# Patient Record
Sex: MALE | Race: WHITE | Employment: FULL TIME | URBAN - METROPOLITAN AREA
[De-identification: names, ages, dates, MRNs, and addresses within clinical notes are randomized per-mention and may not be internally consistent; named-entity substitution may affect disease eponyms.]

---

## 2019-06-23 ENCOUNTER — APPOINTMENT (OUTPATIENT)
Dept: GENERAL RADIOLOGY | Facility: CLINIC | Age: 39
End: 2019-06-23
Attending: EMERGENCY MEDICINE
Payer: COMMERCIAL

## 2019-06-23 ENCOUNTER — HOSPITAL ENCOUNTER (EMERGENCY)
Facility: CLINIC | Age: 39
Discharge: HOME | End: 2019-06-23
Attending: EMERGENCY MEDICINE | Admitting: EMERGENCY MEDICINE
Payer: COMMERCIAL

## 2019-06-23 VITALS
SYSTOLIC BLOOD PRESSURE: 134 MMHG | DIASTOLIC BLOOD PRESSURE: 91 MMHG | OXYGEN SATURATION: 98 % | TEMPERATURE: 97.9 F | RESPIRATION RATE: 16 BRPM | WEIGHT: 215 LBS | HEART RATE: 68 BPM

## 2019-06-23 DIAGNOSIS — S69.92XA INJURY OF LEFT HAND, INITIAL ENCOUNTER: ICD-10-CM

## 2019-06-23 PROCEDURE — 25000132 ZZH RX MED GY IP 250 OP 250 PS 637: Performed by: EMERGENCY MEDICINE

## 2019-06-23 PROCEDURE — 90715 TDAP VACCINE 7 YRS/> IM: CPT | Performed by: EMERGENCY MEDICINE

## 2019-06-23 PROCEDURE — 90471 IMMUNIZATION ADMIN: CPT

## 2019-06-23 PROCEDURE — 25000128 H RX IP 250 OP 636: Performed by: EMERGENCY MEDICINE

## 2019-06-23 PROCEDURE — 96374 THER/PROPH/DIAG INJ IV PUSH: CPT

## 2019-06-23 PROCEDURE — 29130 APPL FINGER SPLINT STATIC: CPT | Mod: F4

## 2019-06-23 PROCEDURE — 12002 RPR S/N/AX/GEN/TRNK2.6-7.5CM: CPT

## 2019-06-23 PROCEDURE — 99284 EMERGENCY DEPT VISIT MOD MDM: CPT | Mod: 25

## 2019-06-23 PROCEDURE — 73140 X-RAY EXAM OF FINGER(S): CPT | Mod: LT

## 2019-06-23 RX ORDER — IBUPROFEN 600 MG/1
600 TABLET, FILM COATED ORAL ONCE
Status: COMPLETED | OUTPATIENT
Start: 2019-06-23 | End: 2019-06-23

## 2019-06-23 RX ORDER — HYDROCODONE BITARTRATE AND ACETAMINOPHEN 5; 325 MG/1; MG/1
1 TABLET ORAL EVERY 6 HOURS PRN
Qty: 18 TABLET | Refills: 0 | Status: SHIPPED | OUTPATIENT
Start: 2019-06-23 | End: 2019-06-26

## 2019-06-23 RX ORDER — BUPIVACAINE HYDROCHLORIDE 5 MG/ML
INJECTION, SOLUTION PERINEURAL
Status: DISCONTINUED
Start: 2019-06-23 | End: 2019-06-23 | Stop reason: HOSPADM

## 2019-06-23 RX ORDER — CEPHALEXIN 500 MG/1
500 CAPSULE ORAL 4 TIMES DAILY
Qty: 40 CAPSULE | Refills: 0 | Status: SHIPPED | OUTPATIENT
Start: 2019-06-23 | End: 2019-07-03

## 2019-06-23 RX ORDER — CEFAZOLIN SODIUM 2 G/100ML
2 INJECTION, SOLUTION INTRAVENOUS ONCE
Status: COMPLETED | OUTPATIENT
Start: 2019-06-23 | End: 2019-06-23

## 2019-06-23 RX ADMIN — IBUPROFEN 600 MG: 600 TABLET, FILM COATED ORAL at 13:12

## 2019-06-23 RX ADMIN — CEFAZOLIN SODIUM 2 G: 2 INJECTION, SOLUTION INTRAVENOUS at 14:09

## 2019-06-23 RX ADMIN — CLOSTRIDIUM TETANI TOXOID ANTIGEN (FORMALDEHYDE INACTIVATED), CORYNEBACTERIUM DIPHTHERIAE TOXOID ANTIGEN (FORMALDEHYDE INACTIVATED), BORDETELLA PERTUSSIS TOXOID ANTIGEN (GLUTARALDEHYDE INACTIVATED), BORDETELLA PERTUSSIS FILAMENTOUS HEMAGGLUTININ ANTIGEN (FORMALDEHYDE INACTIVATED), BORDETELLA PERTUSSIS PERTACTIN ANTIGEN, AND BORDETELLA PERTUSSIS FIMBRIAE 2/3 ANTIGEN 0.5 ML: 5; 2; 2.5; 5; 3; 5 INJECTION, SUSPENSION INTRAMUSCULAR at 14:09

## 2019-06-23 ASSESSMENT — ENCOUNTER SYMPTOMS
NUMBNESS: 0
ROS SKIN COMMENTS: LACERATION
WEAKNESS: 1

## 2019-06-23 NOTE — LETTER
June 23, 2019      To Whom It May Concern:      Prieto You was seen in our Emergency Department today, 06/23/19.  Please excuse him from work on 6/24/2019 for continued care.  Sincerely,        Rose Marie CAMPBELL RN

## 2019-06-23 NOTE — ED PROVIDER NOTES
"  History     Chief Complaint:  Hand Injury    HPI:   The history is provided by the patient.      Prieto You is a generally healthy right-handed 38 year old male who presents with a left hand laceration. The patient states he was using a electric saw when he accidentally cut into his left hand just at the base and to the side of the left pinky finger. The patient was able to control the bleeding at home. He denies loss of sensation. He states that the pinky finger feels as if it is \"stuck\" and he is having difficulty fully extending it. The patient denies any further injuries. He is unsure when his last Tdap immunization was.     Allergies:  No known drug allergies      Medications:    The patient is not currently taking any prescribed medications.     Past Medical History:    The patient does not have any past pertinent medical history.     Past Surgical History:    History reviewed. No pertinent surgical history.     Family History:    History reviewed. No pertinent family history.      Social History:  No PCP  The patient is accompanied to the ED by a family member.      Review of Systems   Skin:        Laceration    Neurological: Positive for weakness. Negative for numbness.   All other systems reviewed and are negative.      Physical Exam     Patient Vitals for the past 24 hrs:   BP Temp Temp src Pulse Heart Rate Resp SpO2 Weight   06/23/19 1610 (!) 134/91 -- -- 68 -- 16 98 % --   06/23/19 1249 (!) 167/113 97.9  F (36.6  C) Oral -- 92 16 98 % 97.5 kg (215 lb)        Physical Exam  General: Sitting on gurney with gauze over left hand.   HEENT:   The scalp and head appear normal    The pupils are equal, round, and reactive to light    Extraocular muscles are intact.    The nose is normal.    The oropharynx is normal.      Uvula is in the midline.    Neck:  Normal range of motion.    Lungs:  Clear.      No rales, no wheezing.      There is no tachypnea.  Non-labored.  Cardiac: Regular rate.      Normal S1 and " S2.      No pathological murmur/rub    Abdomen: Soft. No distension, no localized tenderness or rebound.  MS:  5 cm laceration that crosses the ulnar aspect of the 5th MCP of the left non-dominant hand.    Lacerated his ulnar collateral stabilizers to that joint.     The finger is deviated in a varus deformity in a radial direction.     When he attempts to bend, cannot flex because the 4th finger prevents it.     Not complete extension, intact 2-point discrimination at less that 5 mm, intact sharp versus dull sense distally in the finger.   Neurologic:     Normal mentation.  No cranial nerve deficits.  No focal motor or sensory changes.      Speech normal.  Psych:  Awake.     Alert.      Normal affect.      Appropriate interactions.  Skin:  No rash.      No lesions.      Emergency Department Course     Imaging:  Radiographic findings were communicated with the patient who voiced understanding of the findings.    Fingers XR, 2-3 views, left  Impression: Soft tissue irregularity adjacent to the left fifth MCP  joint is consistent with laceration. Lucency within the adjacent fifth  metacarpal head is consistent with a bony defect related to the saw  injury. Multiple small associated bony fragments are noted adjacent to  the fifth MCP joint. A few tiny irregular high density foci within the  soft tissue adjacent to the left fifth DIP joint may represent tiny  foreign bodies in this location.  As read by Radiology.       Narrative: Procedure: Laceration Repair        LACERATION:  A subcutaneous clean 5 cm laceration.      LOCATION:  crosses the ulnar aspect of the 5th MCP of left hand       FUNCTION:  Distally sensation and circulation are intact.      ANESTHESIA:  Local using bupivacaine 0.5% total of 5 mLs      PREPARATION:  Irrigation and Scrubbing with Normal Saline and Shur Clens      DEBRIDEMENT:  no debridement      CLOSURE:  Wound was loosely closed with One Layer.  Skin closed with 6 x 5.0 Ethylon using  interrupted sutures.     Interventions:  1312: ibuprofen 600 mg, PO  1409: ANCEF intermittent infusion 2 g, IV  1409: Tdap injection      Emergency Department Course:  Past medical records, nursing notes, and vitals reviewed.  1315: I performed an exam of the patient and obtained history, as documented above.  The patient was sent for X-ray imaging while in the emergency department, findings above.       1430: I rechecked the patient. Explained findings to the patient.    1451: I discussed the patient with Dr. Mccall of hand surgery.     I rechecked the patient. Findings and plan explained to the Patient. Patient discharged home with instructions regarding supportive care, medications, and reasons to return. The importance of close follow-up was reviewed.      Impression & Plan      Medical Decision Making:  Prieto You is a right hand dominant male who lacerated his left nondominant hand with a miter saw this morning while building a porch. It is fractured his distal fifth metacarpal just below the head. It had disrupted his ulnar collateral stabilizers on that hand making it difficult for him to flex his finger. I don't think he lacerated a flexor tendon. I am concerned about the extensor tendons though, as this does overlie their vicinity. It doesn't appear that he can obtain full extension either. He does have intact sensation as per my sensory exam. The findings were discussed with Dr. Mccall.     The patient's tetanus status has been updated and he was given 2 g of ancef IV as well. Local block was done using bupivacaine 0.5% 5 ml with excellent anesthesia. It was scrubbed with ShurCleanse, irrigated with normal saline for one liter, and loosely sewn with 6 x 4.0 ethylon interrupted with good hemostasis and good alignment and closure.     He is placed in a bacitracin adaptic gauze wrap dressing and then given a volar alumafoam splint. Immobilized the affected digit. Sling for when he is upright. He will  elevate above his heart tonight as much as possible. He will start the Keflex tonight. Use the Vicodin as directed. Dr. Mccall's office will contact him either tonight or tomorrow to give him the time and place they should meet to take him to the OR for washing this out and repairing the damaged structures. He should be NPO after midnight.     Critical Care time:  none    Diagnosis:    ICD-10-CM    1. Injury of left hand, initial encounter S69.92XA        Disposition:  discharged to home    Discharge Medications:     Medication List      Started    cephALEXin 500 MG capsule  Commonly known as:  KEFLEX  500 mg, Oral, 4 TIMES DAILY     HYDROcodone-acetaminophen 5-325 MG tablet  Commonly known as:  NORCO  1 tablet, Oral, EVERY 6 HOURS PRN          I, Megan Beh, am serving as a scribe at 1:15 PM on 6/23/2019 to document services personally performed by Asael Cardona MD based on my observations and the provider's statements to me.      Megan Beh  6/23/2019   [facility] EMERGENCY DEPARTMENT       Asael Cardona MD  06/24/19 0805

## 2019-06-23 NOTE — DISCHARGE INSTRUCTIONS
Dr Mccall will contact you to do surgery tomorrow on your injured hand.  Do not eat nor drink after midnight.  Keep would clean, dry, and covered.  Elevate above your heart today and tonight on pillows when you go to bed.  Start your Keflex tonight.

## 2019-06-23 NOTE — ED AVS SNAPSHOT
[facility] Emergency Department  [address]  [facility] [address]  Phone:  [phone]  Fax:  [phone]                                    Prieto You   MRN: [id number]    Department:  [facility] Emergency Department   Date of Visit:  6/23/2019           After Visit Summary Signature Page    I have received my discharge instructions, and my questions have been answered. I have discussed any challenges I see with this plan with the nurse or doctor.    ..........................................................................................................................................  Patient/Patient Representative Signature      ..........................................................................................................................................  Patient Representative Print Name and Relationship to Patient    ..................................................               ................................................  Date                                   Time    ..........................................................................................................................................  Reviewed by Signature/Title    ...................................................              ..............................................  Date                                               Time          22EPIC Rev 08/18

## 2019-10-18 ENCOUNTER — OFFICE VISIT (OUTPATIENT)
Dept: UROLOGY | Facility: CLINIC | Age: 39
End: 2019-10-18
Payer: COMMERCIAL

## 2019-10-18 VITALS
BODY MASS INDEX: 28.49 KG/M2 | WEIGHT: 215 LBS | HEIGHT: 73 IN | OXYGEN SATURATION: 95 % | HEART RATE: 100 BPM | SYSTOLIC BLOOD PRESSURE: 130 MMHG | DIASTOLIC BLOOD PRESSURE: 98 MMHG

## 2019-10-18 DIAGNOSIS — Z30.09 VASECTOMY EVALUATION: Primary | ICD-10-CM

## 2019-10-18 PROCEDURE — 99203 OFFICE O/P NEW LOW 30 MIN: CPT | Performed by: UROLOGY

## 2019-10-18 ASSESSMENT — PAIN SCALES - GENERAL: PAINLEVEL: NO PAIN (0)

## 2019-10-18 ASSESSMENT — MIFFLIN-ST. JEOR: SCORE: 1949.11

## 2019-10-18 NOTE — NURSING NOTE
Chief Complaint   Patient presents with     Sterilization     Pt here for vascetomy consultation     Patricia Tejeda, CMA

## 2019-10-18 NOTE — PROGRESS NOTES
VASECTOMY CONSULTATION NOTE  DATE OF VISIT: 10/18/2019  PHIL   PATIENT NAME: Prieto You    YOB: 1980      REASON FOR CONSULTATION: Mr. Prieto You is a 39 year old year old gentleman who came to the urology clinic today requesting a vasectomy. He has 1 children - 6 year old boy - and he wishes to have a vasectomy for birth control. His wife is in agreement with this plan.     He works as a meat .    PAST MEDICAL HISTORY: History reviewed. No pertinent past medical history.    PAST SURGICAL HISTORY: History reviewed. No pertinent surgical history.   Recent left hand surgery.    MEDICATIONS: No current outpatient medications on file.    ALLERGIES: No Known Allergies    FAMILY HISTORY: History reviewed. No pertinent family history.    SOCIAL HISTORY:   Social History     Socioeconomic History     Marital status:      Spouse name: Not on file     Number of children: Not on file     Years of education: Not on file     Highest education level: Not on file   Occupational History     Not on file   Social Needs     Financial resource strain: Not on file     Food insecurity:     Worry: Not on file     Inability: Not on file     Transportation needs:     Medical: Not on file     Non-medical: Not on file   Tobacco Use     Smoking status: Current Some Day Smoker     Packs/day: 0.00     Smokeless tobacco: Never Used   Substance and Sexual Activity     Alcohol use: Not on file     Drug use: Not on file     Sexual activity: Not on file   Lifestyle     Physical activity:     Days per week: Not on file     Minutes per session: Not on file     Stress: Not on file   Relationships     Social connections:     Talks on phone: Not on file     Gets together: Not on file     Attends Hoahaoism service: Not on file     Active member of club or organization: Not on file     Attends meetings of clubs or organizations: Not on file     Relationship status: Not on file     Intimate partner violence:      "Fear of current or ex partner: Not on file     Emotionally abused: Not on file     Physically abused: Not on file     Forced sexual activity: Not on file   Other Topics Concern     Parent/sibling w/ CABG, MI or angioplasty before 65F 55M? Not Asked   Social History Narrative     Not on file       HEIGHT: 6' 1\"     WEIGHT: 215 lbs 0 oz   BP: 130/98    PULSE: 100    EXAM: He is alert and oriented and well-appearing.  Examination of the scrotum reveals normal scrotal skin.  The testicles are normal to palpation bilaterally with no intratesticular lesions.  He has normally palpable vasa bilaterally.    DIAGNOSIS: Request for sterilization    PLAN: The risks of the procedure as well as expectations for recovery and outcomes were explained in detail to him.  He was counseled on the risks for bleeding infection and pain after the procedure. We discussed the risk of post-vasectomy pain syndrome.  He was instructed to continue to use contraception until he had proven azoospermia on a semen specimen.  This would normally be collected at least 3 months after the procedure. Also discussed the rare, but possible risk of re-canalization of the vas, even after successful vasectomy with sterile semen specimen.  He was instructed to hold all anticoagulants medications for one week prior to the procedure.  It was recommended that he have someone else drive him home after his vasectomy.  In light of these risks and expectations he would like to proceed.  We are scheduling a vasectomy in the office in the near future.    Pt. Understands:  -1/1000-1/3000 risk of future pregnancy even with perfectly done vasectomy  -vasectomy is a permanent procedure    -he may cryopreserve sperm if he wishes   -1-5% risk of post-vasectomy pain syndrome   -1-5% risk of complication, primarily infection or bleeding  - he needs to have a semen sample that shows no sperm before getting approval for unprotected intercourse.      Thank you for the kind " consultation.    Time spent: 30 minutes of which >50% was spent counseling.    Walter Arriaga MD   Urology  [facility]  Clinic Phone [phone]

## 2019-10-18 NOTE — PATIENT INSTRUCTIONS
"[facility] UROLOGY  Vasectomy Information  [phone]    Preoperative Instructions:    _x____ You may have breakfast on the morning of your procedure.  If your procedure is          in the afternoon, you may have lunch as well.    ___x__ You must have someone drive you home after the procedure if you have been    prescribed an oral sedative (valium).                   __x___ Do not take any aspirin, blood-thinning or anti-inflammatory medication for at    least 7-10 days before the procedure (this includes but is not limited to Advil,   Aleve, Ecotrin and Bufferin).    __x___ Please shave your scrotum (see diagram) the morning of your procedure.  Use              Hibiclens (available at your local drugstore) antibacterial cleanser.        Postoperative Instructions Follow Vasectomy    Under routine circumstances, please note the following:    -No heavy lifting (over 15 lbs) for 48 hour.  -You may shower after 24 hours.  You may have a tub bath or use a swimming pool after one week postoperatively.  Your doctor will advise you if he feels it is helpful to soak in a bathtub postoperatively.  -Do not engage in intercourse for at least ten days and then proceed when comfortable.  -Wear an athletic supporter for 48 hours postoperatively or until any discomfort ceases.  -Your physician will instruct you regarding the use of ice in the recovery room or at home after the procedure, as necessary.  -Please remember as you resume your activity that you may experience some discomfor and/or swelling for the one to two weeks following the procedure.  If this occurs decrease activity and slightly elevate the scrotum (athletic supporter).  -As your stitches dissolve it may appear that the incision is \"gaping.\"  This is normal.    Necessary follow-up:  You do not need a follow up appointment unless you have problems after your procedure.  Please call our office at [phone] if you do.    At the time of your procedure you will be " given the supplies for your post procedure follow up.  The test should be done AT LEAST THREE MONTHS AFTER your vasecomy.  During this time, be certain to maintain birth control measure!    Between the time of your procedure and the time that you have your first sperm count it is very important that you have a minimum of 30 ejaculations.  If you have any questions about this, please consult your physician.    If the sperm count that is done at three months is negative, you will be considered sterile.  Until, you are told that you are free to discontinue birth control you are considered fertile.    If your sperm counts reveal the presence of sperm, you will be advised to repeat the test until a negative result is obtained.     NOTE:  Please call our office one week after dropping off your sample for the result.  Test results will only be given to the patient.

## 2019-10-18 NOTE — LETTER
10/18/2019       RE: Prieto You  [address] [facility] [address]     Dear Colleague,    Thank you for referring your patient, Prieto You, to the [facility] UROLOGY CLINIC [place] at [facility]. Please see a copy of my visit note below.    VASECTOMY CONSULTATION NOTE  DATE OF VISIT: 10/18/2019  PHIL   PATIENT NAME: Prieto You    YOB: 1980      REASON FOR CONSULTATION: Mr. Prieto You is a 39 year old year old gentleman who came to the urology clinic today requesting a vasectomy. He has 1 children - 6 year old boy - and he wishes to have a vasectomy for birth control. His wife is in agreement with this plan.     He works as a meat .    PAST MEDICAL HISTORY: History reviewed. No pertinent past medical history.    PAST SURGICAL HISTORY: History reviewed. No pertinent surgical history.   Recent left hand surgery.    MEDICATIONS: No current outpatient medications on file.    ALLERGIES: No Known Allergies    FAMILY HISTORY: History reviewed. No pertinent family history.    SOCIAL HISTORY:   Social History     Socioeconomic History     Marital status:      Spouse name: Not on file     Number of children: Not on file     Years of education: Not on file     Highest education level: Not on file   Occupational History     Not on file   Social Needs     Financial resource strain: Not on file     Food insecurity:     Worry: Not on file     Inability: Not on file     Transportation needs:     Medical: Not on file     Non-medical: Not on file   Tobacco Use     Smoking status: Current Some Day Smoker     Packs/day: 0.00     Smokeless tobacco: Never Used   Substance and Sexual Activity     Alcohol use: Not on file     Drug use: Not on file     Sexual activity: Not on file   Lifestyle     Physical activity:     Days per week: Not on file     Minutes per session: Not on file     Stress: Not on file   Relationships     Social  "connections:     Talks on phone: Not on file     Gets together: Not on file     Attends Congregational service: Not on file     Active member of club or organization: Not on file     Attends meetings of clubs or organizations: Not on file     Relationship status: Not on file     Intimate partner violence:     Fear of current or ex partner: Not on file     Emotionally abused: Not on file     Physically abused: Not on file     Forced sexual activity: Not on file   Other Topics Concern     Parent/sibling w/ CABG, MI or angioplasty before 65F 55M? Not Asked   Social History Narrative     Not on file       HEIGHT: 6' 1\"     WEIGHT: 215 lbs 0 oz   BP: 130/98    PULSE: 100    EXAM: He is alert and oriented and well-appearing.  Examination of the scrotum reveals normal scrotal skin.  The testicles are normal to palpation bilaterally with no intratesticular lesions.  He has normally palpable vasa bilaterally.    DIAGNOSIS: Request for sterilization    PLAN: The risks of the procedure as well as expectations for recovery and outcomes were explained in detail to him.  He was counseled on the risks for bleeding infection and pain after the procedure. We discussed the risk of post-vasectomy pain syndrome.  He was instructed to continue to use contraception until he had proven azoospermia on a semen specimen.  This would normally be collected at least 3 months after the procedure. Also discussed the rare, but possible risk of re-canalization of the vas, even after successful vasectomy with sterile semen specimen.  He was instructed to hold all anticoagulants medications for one week prior to the procedure.  It was recommended that he have someone else drive him home after his vasectomy.  In light of these risks and expectations he would like to proceed.  We are scheduling a vasectomy in the office in the near future.    Pt. Understands:  -1/1000-1/3000 risk of future pregnancy even with perfectly done vasectomy  -vasectomy is a " permanent procedure    -he may cryopreserve sperm if he wishes   -1-5% risk of post-vasectomy pain syndrome   -1-5% risk of complication, primarily infection or bleeding  - he needs to have a semen sample that shows no sperm before getting approval for unprotected intercourse.      Thank you for the kind consultation.    Time spent: 30 minutes of which >50% was spent counseling.    Walter Arriaga MD   Urology  [facility]  Clinic Phone [phone]        Again, thank you for allowing me to participate in the care of your patient.      Sincerely,    Walter Arriaga MD

## 2019-12-13 ENCOUNTER — OFFICE VISIT (OUTPATIENT)
Dept: UROLOGY | Facility: CLINIC | Age: 39
End: 2019-12-13
Payer: COMMERCIAL

## 2019-12-13 VITALS
DIASTOLIC BLOOD PRESSURE: 94 MMHG | WEIGHT: 215 LBS | BODY MASS INDEX: 28.49 KG/M2 | OXYGEN SATURATION: 94 % | HEIGHT: 73 IN | SYSTOLIC BLOOD PRESSURE: 134 MMHG | HEART RATE: 94 BPM

## 2019-12-13 DIAGNOSIS — Z30.2 ENCOUNTER FOR STERILIZATION: Primary | ICD-10-CM

## 2019-12-13 PROCEDURE — 88302 TISSUE EXAM BY PATHOLOGIST: CPT | Performed by: UROLOGY

## 2019-12-13 PROCEDURE — 55250 REMOVAL OF SPERM DUCT(S): CPT | Performed by: UROLOGY

## 2019-12-13 RX ORDER — LIDOCAINE HYDROCHLORIDE 20 MG/ML
20 INJECTION, SOLUTION INFILTRATION; PERINEURAL ONCE
Status: COMPLETED | OUTPATIENT
Start: 2019-12-13 | End: 2019-12-13

## 2019-12-13 RX ADMIN — LIDOCAINE HYDROCHLORIDE 20 ML: 20 INJECTION, SOLUTION INFILTRATION; PERINEURAL at 16:45

## 2019-12-13 ASSESSMENT — PAIN SCALES - GENERAL
PAINLEVEL: NO PAIN (0)
PAINLEVEL: NO PAIN (0)

## 2019-12-13 ASSESSMENT — MIFFLIN-ST. JEOR: SCORE: 1949.11

## 2019-12-13 NOTE — LETTER
12/13/2019       RE: Prieto You  [address] [facility] [place] [address]     Dear Colleague,    Thank you for referring your patient, Prieto You, to the [facility] [place] at [facility]. Please see a copy of my visit note below.    OFFICE VASECTOMY OPERATIVE NOTE  [facility]    DATE: 12/13/19  PATIENT: Prieto You    YOB: 1980    Prieto You is a 39 year old male.  He has 1  child - 6 year old boy -  and he wishes a vasectomy for birth control.  He has read the brochure and he has shaved himself.  I reviewed the vasectomy procedure with him explaining that it would be done with a local anesthetic given just in the location where the vasectomy would be done.  It would be done through incisions with the removal of segments of the vasa, cauterization of the ends, and burying the ends separate with sutures.      Pt. Understands:  -1/1000-1/3000 risk of future pregnancy even with perfectly done vasectomy  -vasectomy is a permanent procedure    -he may cryopreserve sperm if he wishes   -1-5% risk of post-vasectomy pain syndrome   -1-5% risk of complication, primarily infection or bleeding  - he needs to have a semen sample that shows no sperm before getting approval for unprotected intercourse.      Complications such as bleeding, infection, and damage to other tissues in the area were discussed. I recommended that an ice bag be placed on the scrotum off and on tonight to help reduce pain and swelling.      He was reminded that he was not sterile immediately after the vasectomy that it would take at least 20 ejaculations to empty the vas of any remaining sperm.  He was not to provide a semen sample until after the 20th ejaculation and not before 12 weeks after the vas. He was  to fulfill both of those requirements.   He understands it is his responsibility to find out the results of the vas before proceeding with intercourse  without birth control protection.  Other items discussed were activity afterwards, returning to work, voluntary physical activity,  resuming sexual activity, clothing to wear, bathing, and care of the vas site and expected changes in the site as healing progresses.  After signing the permit, bilateral vasectomy was done as described below.     ANESTHESIA: Local    DETAILS OF PROCEDURE: The risks of the procedure were explained in detail to the patient and informed consent was obtained. The patient was placed supine on the procedure table and the penis and scrotum were prepped and draped in the standard sterile fashion. The right vas deferens was isolated and brought up to the skin. 1% lidocaine local anesthesia was used to infiltrate the skin and the spermatic cord. A small incision was created and adventitial tissues were swept away from the vas. A 1 cm segment of the vas was excised and sent for pathology. The proximal and distal lumina of the vas were cauterized and then each segment was tied off in a knuckling-fashion with a 3-0 vicryl suture. Hemostasis was ensured and the segments were released back into the scrotum. Meticulous hemostasis was achieved. Next the left vas was brought to the skin and a vasectomy was performed in the similar fashion. At the end of the procedure a single 3-0 chromic suture was placed in the skin.     COMPLICATIONS: None    TAKE HOME MEDICATIONS: Tylenol every 6 hours, PRN    DISMISSAL INSTRUCTIONS:  - Ice pack to scrotum 15 to 20 minutes each hour awake for 36 to 40 hours.  - No strenuous activity or ejaculation for 14 days.  - No unprotected sexual activity until proven azoospermia on semen samples at 3 months.  - Referred to patient handout for normal postop expectations and indications to contact nurse or physician.    M.D.: Walter Arriaga MD        Again, thank you for allowing me to participate in the care of your patient.      Sincerely,    Walter Arriaga MD

## 2019-12-13 NOTE — NURSING NOTE
Chief Complaint   Patient presents with     Sterilization     Pt here for in office vasectomy     Patient has signed the consent form stating that we will be doing a bilateral vasectomy today and that this is the correct procedure.  I verbally confirmed the patient's identity using two indicators, relevant allergies, and that the correct equipment was available. Patient was cleaned and prepped according to the appropriate policy.  Equipment was prepped in a sterile fashion and MD was informed that patient was ready.    Consent read and signed: Yes  Aspirin/blood thinning products stopped 7 days prior to procedure: Yes  No Known Allergies    Physician performed procedure.  After the procedure the patient was instructed to wait approximately three months and at least 30 ejaculations prior to returning a sample to confirm sterility.  The patient was instructed to bring in sample within 3-6 hours post sample ejaculation.  Any additional medications after the procedure were sent to the patient's pharmacy and instructions were given according to company protocol and the performing physician.  The physician performing the procedure prescribed as they felt appropriate.  Patient was also instructed to avoid heavy lifting or strenuous activity for 10-14 days and to ice the scrotum.  Samples from the R and L vas deferens were sent to the lab and an order was placed for future semen analysis.   The following medication was given:     MEDICATION:  Lidocaine 2% Soln  ROUTE: vas deferens  SITE: vas deferens  DOSE: 20mL  LOT #: [id number]  : [employer]  EXPIRATION DATE: 04/23  NDC#: 71976-795-48   Was there drug waste? No  Multi-dose vial: Yes    Patricia Tejeda CMA  December 13, 2019   Patricia Tejeda CMA

## 2019-12-13 NOTE — PROGRESS NOTES
OFFICE VASECTOMY OPERATIVE NOTE  [facility]    DATE: 12/13/19  PATIENT: Prieto You    YOB: 1980    Prieto You is a 39 year old male.  He has 1 child - 6 year old boy - and he wishes a vasectomy for birth control.  He has read the brochure and he has shaved himself.  I reviewed the vasectomy procedure with him explaining that it would be done with a local anesthetic given just in the location where the vasectomy would be done.  It would be done through incisions with the removal of segments of the vasa, cauterization of the ends, and burying the ends separate with sutures.      Pt. Understands:  -1/1000-1/3000 risk of future pregnancy even with perfectly done vasectomy  -vasectomy is a permanent procedure    -he may cryopreserve sperm if he wishes   -1-5% risk of post-vasectomy pain syndrome   -1-5% risk of complication, primarily infection or bleeding  - he needs to have a semen sample that shows no sperm before getting approval for unprotected intercourse.      Complications such as bleeding, infection, and damage to other tissues in the area were discussed. I recommended that an ice bag be placed on the scrotum off and on tonight to help reduce pain and swelling.      He was reminded that he was not sterile immediately after the vasectomy that it would take at least 20 ejaculations to empty the vas of any remaining sperm.  He was not to provide a semen sample until after the 20th ejaculation and not before 12 weeks after the vas. He was  to fulfill both of those requirements.   He understands it is his responsibility to find out the results of the vas before proceeding with intercourse without birth control protection.  Other items discussed were activity afterwards, returning to work, voluntary physical activity,  resuming sexual activity, clothing to wear, bathing, and care of the vas site and expected changes in the site as healing progresses.  After signing the permit, bilateral vasectomy  was done as described below.     ANESTHESIA: Local    DETAILS OF PROCEDURE: The risks of the procedure were explained in detail to the patient and informed consent was obtained. The patient was placed supine on the procedure table and the penis and scrotum were prepped and draped in the standard sterile fashion. The right vas deferens was isolated and brought up to the skin. 1% lidocaine local anesthesia was used to infiltrate the skin and the spermatic cord. A small incision was created and adventitial tissues were swept away from the vas. A 1 cm segment of the vas was excised and sent for pathology. The proximal and distal lumina of the vas were cauterized and then each segment was tied off in a knuckling-fashion with a 3-0 vicryl suture. Hemostasis was ensured and the segments were released back into the scrotum. Meticulous hemostasis was achieved. Next the left vas was brought to the skin and a vasectomy was performed in the similar fashion. At the end of the procedure a single 3-0 chromic suture was placed in the skin.     COMPLICATIONS: None    TAKE HOME MEDICATIONS: Tylenol every 6 hours, PRN    DISMISSAL INSTRUCTIONS:  - Ice pack to scrotum 15 to 20 minutes each hour awake for 36 to 40 hours.  - No strenuous activity or ejaculation for 14 days.  - No unprotected sexual activity until proven azoospermia on semen samples at 3 months.  - Referred to patient handout for normal postop expectations and indications to contact nurse or physician.    M.D.: Walter Arriaga MD

## 2019-12-17 LAB — COPATH REPORT: NORMAL
